# Patient Record
(demographics unavailable — no encounter records)

---

## 2024-11-07 NOTE — REASON FOR VISIT
[Initial Evaluation] : an initial evaluation for [Mother] : mother [[employer] ] : provided by [employer]   [FreeTextEntry2] : lump behind left ear [Interpreters_IDNumber] : [id number] [Interpreters_FullName] : Delmy [TWNoteComboBox1] : Pakistani

## 2024-11-07 NOTE — ASSESSMENT
[FreeTextEntry1] : We discussed possible differential diagnosis of this mass including possible vascular malformation. Unfortunately, I do not have access to outside imaging and mass has grown in size according to mom. I have ordered a repeat ultrasound today. Depending on the results of the ultrasound we may consider further imaging including CT and/or MRI.

## 2024-11-07 NOTE — HISTORY OF PRESENT ILLNESS
CHECK ONBASE FOR SCAN     [No Personal or Family History of Easy Bruising, Bleeding, or Issues with General Anesthesia] : No Personal or Family History of easy bruising, bleeding, or issues with general anesthesia [de-identified] : LISA is a 4 month old girl who presents for lump behind left ear. She is here with mother who provides history.  Mother reports that she noticed a lump behind her left ear 2 months ago, went to ER- took imaging (does not have records, but was told it may be a cyst).  Reports that it has increased in size and has become redder since then.  Reports occasional pulling of left ear.  No drainage, fevers. No otorrhea, ear infections, hearing concerns.  No other similar masses elsewhere on the body.   No hearing concerns. No ear infections.  Passed NBHS.  No other otolaryngology concerns.

## 2024-11-07 NOTE — CONSULT LETTER
[Dear  ___] : Dear  [unfilled], [Courtesy Letter:] : I had the pleasure of seeing your patient, [unfilled], in my office today. [Sincerely,] : Sincerely, [FreeTextEntry2] : Dr Liliya Mae [address] [place] [address] [FreeTextEntry3] : Verito Garcia M.[place]. [place] [address] Tel [phone] Fax [phone]

## 2024-11-07 NOTE — PHYSICAL EXAM
[Exposed Vessel] : left anterior vessel not exposed [Increased Work of Breathing] : no increased work of breathing with use of accessory muscles and retractions [Normal Gait and Station] : normal gait and station [Normal muscle strength, symmetry and tone of facial, head and neck musculature] : normal muscle strength, symmetry and tone of facial, head and neck musculature [Normal] : no cervical lymphadenopathy [FreeTextEntry7] : ~6cm enlargement in the left post-auricular region, soft and mobile, non-tender, some overlying erythema

## 2024-11-27 NOTE — REASON FOR VISIT
[Subsequent Evaluation] : a subsequent evaluation for [Mother] : mother [[employer] ] : provided by [employer]   [FreeTextEntry2] : vascular anomaly  [Interpreters_IDNumber] : [id number] [Interpreters_FullName] : Steph  [TWNoteComboBox1] : Austrian

## 2024-11-27 NOTE — CONSULT LETTER
[Dear  ___] : Dear  [unfilled], [Consult Letter:] : I had the pleasure of evaluating your patient, [unfilled]. [Please see my note below.] : Please see my note below. [Consult Closing:] : Thank you very much for allowing me to participate in the care of this patient.  If you have any questions, please do not hesitate to contact me. [Sincerely,] : Sincerely, [FreeTextEntry2] : Dr. Liliya Mae [address] [place] [address] [FreeTextEntry3] : Cristina Rodriguez MD  Pediatric Otolaryngology/ Head & Neck Surgery [facility] at [facility]   [address] [place] [address] Tel [phone] Fax [phone]

## 2024-11-27 NOTE — ASSESSMENT
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   I explained the natural course of the disease and gave educational material. I  We discussed the risks/benefits/and alternatives of watchful watching, surgery/laser and propranolol vs. timolol.   The child feeds well and has been gaining weight well since birth and sleeping well at night. No concerns of cyanosis, respiratory distress, palpitations or feeding intolerance.   At this point the family opts for observation given stable this last month. They will rtc with note from PCP to document if heart and lung exam and vitals were normal with PCP and if so and propranolol is needed in the future we can start the medication.

## 2024-11-27 NOTE — HISTORY OF PRESENT ILLNESS
[de-identified] : This patient first presented with a left postauricular lesion  This was first noticed at 2months of age   No other lesions.   Since then it has gotten bigger and currently has not gotten much bigger.  f There are no red patchy areas on the skin but has small red spots by the post auricular lesion  There is no ulceration and no associated signs of discomfort or fevers.   NO placenta issues at birth or in utero testing, born and conceived naturally.   US, 11/7/24:  FINDINGS:  In the area of concern posterior to left ear there is a heterogeneous lesion measuring 3.7 x 3.8 x 0.97. There is a large amount of flow with color Doppler evaluation. The lesion abuts the underlying skull. There is no evidence of deep extension.  IMPRESSION:  Vascular lesion in the area of concern, nonspecific but may represent a hemangioma.  There is no history of snoring, mouth breathing or witnessed apnea. No known hearing loss or history of ear infections or throat/tonsil infections. No problems with swallowing or with VPI/Speech/nasal regurgitation. Passed NBHT.

## 2024-11-27 NOTE — PHYSICAL EXAM
[Clear to Auscultation] : lungs were clear to auscultation bilaterally [Normal Gait and Station] : normal gait and station [Normal muscle strength, symmetry and tone of facial, head and neck musculature] : normal muscle strength, symmetry and tone of facial, head and neck musculature [Normal] : no cervical lymphadenopathy [Exposed Vessel] : left anterior vessel not exposed [Wheezing] : no wheezing [Increased Work of Breathing] : no increased work of breathing with use of accessory muscles and retractions [FreeTextEntry7] : psotauricular soft 4x4cm lesion  with subtle vascularity

## 2025-03-19 NOTE — HISTORY OF PRESENT ILLNESS
[de-identified] : This patient first presented with a left postauricular lesion  This was first noticed at 2months of age   No other lesions.   Since then it has gotten bigger. New "stretch brian" like spots in the area.   There are no red patchy areas on the skin but has small red spots by the post auricular lesion  There is no ulceration and no associated signs of discomfort or fevers.   NO placenta issues at birth or in utero testing, born and conceived naturally.   US, 11/7/24:  FINDINGS:  In the area of concern posterior to left ear there is a heterogeneous lesion measuring 3.7 x 3.8 x 0.97. There is a large amount of flow with color Doppler evaluation. The lesion abuts the underlying skull. There is no evidence of deep extension.  IMPRESSION: Vascular lesion in the area of concern, nonspecific but may represent a hemangioma. There is no history of snoring, mouth breathing or witnessed apnea. No known hearing loss or history of ear infections or throat/tonsil infections. No problems with swallowing or with VPI/Speech/nasal regurgitation. Passed NBHT.

## 2025-03-19 NOTE — REASON FOR VISIT
[Subsequent Evaluation] : a subsequent evaluation for [Mother] : mother [[employer] ] : provided by [employer]   [FreeTextEntry2] : vascular anomaly  [Interpreters_IDNumber] : [id number] [Interpreters_FullName] : Steph  [TWNoteComboBox1] : Tunisian

## 2025-03-19 NOTE — PHYSICAL EXAM
[Exposed Vessel] : left anterior vessel not exposed [Clear to Auscultation] : lungs were clear to auscultation bilaterally [Wheezing] : no wheezing [Increased Work of Breathing] : no increased work of breathing with use of accessory muscles and retractions [Normal Gait and Station] : normal gait and station [Normal muscle strength, symmetry and tone of facial, head and neck musculature] : normal muscle strength, symmetry and tone of facial, head and neck musculature [Normal] : no cervical lymphadenopathy [FreeTextEntry7] : Left post auricular 4x4 cm soft lesions stable with suble vasculatiry to skin

## 2025-03-19 NOTE — CONSULT LETTER
[Dear  ___] : Dear  [unfilled], [Consult Letter:] : I had the pleasure of evaluating your patient, [unfilled]. [Please see my note below.] : Please see my note below. [Consult Closing:] : Thank you very much for allowing me to participate in the care of this patient.  If you have any questions, please do not hesitate to contact me. [Sincerely,] : Sincerely, [FreeTextEntry2] : Dr. Liliya Mae [address] [place] [address] [FreeTextEntry3] : Cristina Rodriguez MD  Pediatric Otolaryngology/ Head & Neck Surgery [facility]   [address] [place] [address] Tel [phone] Fax [phone]